# Patient Record
Sex: FEMALE | Race: OTHER | Employment: OTHER | URBAN - METROPOLITAN AREA
[De-identification: names, ages, dates, MRNs, and addresses within clinical notes are randomized per-mention and may not be internally consistent; named-entity substitution may affect disease eponyms.]

---

## 2024-03-11 ENCOUNTER — NURSE TRIAGE (OUTPATIENT)
Age: 69
End: 2024-03-11

## 2024-03-11 NOTE — TELEPHONE ENCOUNTER
----- Message from Shabnam Sanford MA sent at 3/11/2024 10:25 AM EDT -----  Pt calling asking for a call back due to a lot of rectal bleeding since this morning and abd pain.  Citizen of [place] speaking

## 2024-03-11 NOTE — TELEPHONE ENCOUNTER
Spoke to pt using ([id number])  Started last night after eating a meal that didn't agree with her. Blood clots in bowl and on toilet paper, stops when she is done going. No history of hemorrhoids. Lower middle abdominal pain.

## 2024-03-13 ENCOUNTER — OFFICE VISIT (OUTPATIENT)
Dept: GASTROENTEROLOGY | Facility: CLINIC | Age: 69
End: 2024-03-13
Payer: MEDICARE

## 2024-03-13 VITALS
OXYGEN SATURATION: 95 % | HEIGHT: 63 IN | WEIGHT: 209.4 LBS | BODY MASS INDEX: 37.1 KG/M2 | TEMPERATURE: 97.8 F | SYSTOLIC BLOOD PRESSURE: 118 MMHG | DIASTOLIC BLOOD PRESSURE: 80 MMHG | HEART RATE: 78 BPM

## 2024-03-13 DIAGNOSIS — K21.9 GASTROESOPHAGEAL REFLUX DISEASE, UNSPECIFIED WHETHER ESOPHAGITIS PRESENT: ICD-10-CM

## 2024-03-13 DIAGNOSIS — K62.5 RECTAL BLEEDING: Primary | ICD-10-CM

## 2024-03-13 PROCEDURE — 99203 OFFICE O/P NEW LOW 30 MIN: CPT | Performed by: PHYSICIAN ASSISTANT

## 2024-03-13 RX ORDER — DICLOFENAC POTASSIUM 50 MG/1
50 TABLET, FILM COATED ORAL 3 TIMES DAILY
COMMUNITY

## 2024-03-13 RX ORDER — ASPIRIN/SOD BICARB/CITRIC ACID 324 MG
325 TABLET, EFFERVESCENT ORAL EVERY 6 HOURS PRN
COMMUNITY
End: 2024-03-13

## 2024-03-13 RX ORDER — PANTOPRAZOLE SODIUM 40 MG/1
40 TABLET, DELAYED RELEASE ORAL DAILY
COMMUNITY

## 2024-03-13 NOTE — PROGRESS NOTES
[facility] - Outpatient Consultation  La Hebert Flex 69 y.o. female MRN: [id number]  Encounter: [id number]          ASSESSMENT AND PLAN:      1. Rectal bleeding  Episode of what sounds like infectious gastroenteritis Sunday evening - Monday  Initially with N/V/D but eventually with BRBPR   She notes a normal BM today with small amounts of blood  Will check CBC  Will plan colonoscopy  Her last colonoscopy was 3 years ago in [place] with polyps removed    2. Gastroesophageal reflux disease, unspecified whether esophagitis present  Chronic  Takes Pantoprazole PRN  She had an EGD last year in [place] but does not have access to the records and is not sure if there were any serious concerns  Will plan EGD  ______________________________________________________________________    HPI: 59-year-old female with a history of GERD who presents for evaluation of rectal bleeding.  The patient reports that she was in her usual state of good health on Sunday.  She went out to eat with her family.  After returning home she began to feel ill.  She reports that she had nausea, vomiting and diarrhea.  She reports that after her initial bowel movement which was nonbloody her stools became excessively bloody.  She reports that eventually she was only passing blood and blood clots.  This persisted Sunday evening into Monday.  She reports that by yesterday which was Tuesday she was feeling slightly better.  She reports that she had a normal bowel movement today still with a small amount of red blood.  She reports that her daughter who was with her got sick as well but mainly with nausea and vomiting which was quick lived.  Patient reports that her last colonoscopy was about 3 years ago in [place].  She reports having had polyps removed at that time.  She was told that she was due in September of this year but was living in the [place] by that time and so did not have it done.  She denies any  recent unexpected weight loss.  She denies any family history of colorectal cancer.  She takes pantoprazole 40 mg as needed for her acid reflux.  This does work for the most part.  She has no dysphagia or dyne aphasia.  She has had no hematemesis or melena.  She reports having had an upper endoscopy last year in [place].  She was told that there were some abnormalities but cannot remember what this was.  She does remember being told that her lower esophageal sphincter was weak.      REVIEW OF SYSTEMS:    CONSTITUTIONAL: Denies any fever, chills, rigors, and weight loss.  HEENT: No earache or tinnitus. Denies hearing loss or visual disturbances.  CARDIOVASCULAR: No chest pain or palpitations.   RESPIRATORY: Denies any cough, hemoptysis, shortness of breath or dyspnea on exertion.  GASTROINTESTINAL: As noted in the History of Present Illness.   GENITOURINARY: No problems with urination. Denies any hematuria or dysuria.  NEUROLOGIC: No dizziness or vertigo, denies headaches.   MUSCULOSKELETAL: Denies any muscle or joint pain.   SKIN: Denies skin rashes or itching.   ENDOCRINE: Denies excessive thirst. Denies intolerance to heat or cold.  PSYCHOSOCIAL: Denies depression or anxiety. Denies any recent memory loss.       Historical Information   Past Medical History:   Diagnosis Date    Acid reflux     Diabetes mellitus (HCC)     Migraine      Past Surgical History:   Procedure Laterality Date     SECTION      FOOT SURGERY      LUMBAR SPINE SURGERY  2007     Social History   Social History     Substance and Sexual Activity   Alcohol Use Never     Social History     Substance and Sexual Activity   Drug Use Never     Social History     Tobacco Use   Smoking Status Never   Smokeless Tobacco Never     History reviewed. No pertinent family history.    Meds/Allergies       Current Outpatient Medications:     aspirin-sod bicarb-citric acid (Meri-Gilboa) 325 MG TBEF    diclofenac potassium (CATAFLAM) 50 mg tablet    " pantoprazole (PROTONIX) 40 mg tablet    No Known Allergies        Objective     Blood pressure 118/80, pulse 78, temperature 97.8 °F (36.6 °C), height 5' 3\" (1.6 m), weight 95 kg (209 lb 6.4 oz), SpO2 95%. Body mass index is 37.09 kg/m².        PHYSICAL EXAM:      General Appearance:   Alert, cooperative, no distress   HEENT:   Normocephalic, atraumatic, anicteric.     Neck:  Supple, symmetrical, trachea midline   Lungs:   Clear to auscultation bilaterally; no rales, rhonchi or wheezing; respirations unlabored    Heart::   Regular rate and rhythm; no murmur, rub, or gallop.   Abdomen:   Soft, non-tender, non-distended; normal bowel sounds; no masses, no organomegaly    Genitalia:   Deferred    Rectal:   Deferred    Extremities:  No cyanosis, clubbing or edema    Pulses:  2+ and symmetric    Skin:  No jaundice, rashes, or lesions    Lymph nodes:  No palpable cervical lymphadenopathy        Lab Results:   No results found for any previous visit.         Radiology Results:   No results found.  "

## 2024-03-13 NOTE — H&P (VIEW-ONLY)
[facility] - Outpatient Consultation  La Hebert Flex 69 y.o. female MRN: [id number]  Encounter: [id number]          ASSESSMENT AND PLAN:      1. Rectal bleeding  Episode of what sounds like infectious gastroenteritis Sunday evening - Monday  Initially with N/V/D but eventually with BRBPR   She notes a normal BM today with small amounts of blood  Will check CBC  Will plan colonoscopy  Her last colonoscopy was 3 years ago in [place] with polyps removed    2. Gastroesophageal reflux disease, unspecified whether esophagitis present  Chronic  Takes Pantoprazole PRN  She had an EGD last year in [place] but does not have access to the records and is not sure if there were any serious concerns  Will plan EGD  ______________________________________________________________________    HPI: 59-year-old female with a history of GERD who presents for evaluation of rectal bleeding.  The patient reports that she was in her usual state of good health on Sunday.  She went out to eat with her family.  After returning home she began to feel ill.  She reports that she had nausea, vomiting and diarrhea.  She reports that after her initial bowel movement which was nonbloody her stools became excessively bloody.  She reports that eventually she was only passing blood and blood clots.  This persisted Sunday evening into Monday.  She reports that by yesterday which was Tuesday she was feeling slightly better.  She reports that she had a normal bowel movement today still with a small amount of red blood.  She reports that her daughter who was with her got sick as well but mainly with nausea and vomiting which was quick lived.  Patient reports that her last colonoscopy was about 3 years ago in [place].  She reports having had polyps removed at that time.  She was told that she was due in September of this year but was [place] by that time and so did not have it done.  She denies any  recent unexpected weight loss.  She denies any family history of colorectal cancer.  She takes pantoprazole 40 mg as needed for her acid reflux.  This does work for the most part.  She has no dysphagia or dyne aphasia.  She has had no hematemesis or melena.  She reports having had an upper endoscopy last year in [place].  She was told that there were some abnormalities but cannot remember what this was.  She does remember being told that her lower esophageal sphincter was weak.      REVIEW OF SYSTEMS:    CONSTITUTIONAL: Denies any fever, chills, rigors, and weight loss.  HEENT: No earache or tinnitus. Denies hearing loss or visual disturbances.  CARDIOVASCULAR: No chest pain or palpitations.   RESPIRATORY: Denies any cough, hemoptysis, shortness of breath or dyspnea on exertion.  GASTROINTESTINAL: As noted in the History of Present Illness.   GENITOURINARY: No problems with urination. Denies any hematuria or dysuria.  NEUROLOGIC: No dizziness or vertigo, denies headaches.   MUSCULOSKELETAL: Denies any muscle or joint pain.   SKIN: Denies skin rashes or itching.   ENDOCRINE: Denies excessive thirst. Denies intolerance to heat or cold.  PSYCHOSOCIAL: Denies depression or anxiety. Denies any recent memory loss.       Historical Information   Past Medical History:   Diagnosis Date    Acid reflux     Diabetes mellitus (HCC)     Migraine      Past Surgical History:   Procedure Laterality Date     SECTION      FOOT SURGERY      LUMBAR SPINE SURGERY  2007     Social History   Social History     Substance and Sexual Activity   Alcohol Use Never     Social History     Substance and Sexual Activity   Drug Use Never     Social History     Tobacco Use   Smoking Status Never   Smokeless Tobacco Never     History reviewed. No pertinent family history.    Meds/Allergies       Current Outpatient Medications:     aspirin-sod bicarb-citric acid (Meri-[place]) 325 MG TBEF    diclofenac potassium (CATAFLAM) 50 mg tablet    " pantoprazole (PROTONIX) 40 mg tablet    No Known Allergies        Objective     Blood pressure 118/80, pulse 78, temperature 97.8 °F (36.6 °C), height 5' 3\" (1.6 m), weight 95 kg (209 lb 6.4 oz), SpO2 95%. Body mass index is 37.09 kg/m².        PHYSICAL EXAM:      General Appearance:   Alert, cooperative, no distress   HEENT:   Normocephalic, atraumatic, anicteric.     Neck:  Supple, symmetrical, trachea midline   Lungs:   Clear to auscultation bilaterally; no rales, rhonchi or wheezing; respirations unlabored    Heart::   Regular rate and rhythm; no murmur, rub, or gallop.   Abdomen:   Soft, non-tender, non-distended; normal bowel sounds; no masses, no organomegaly    Genitalia:   Deferred    Rectal:   Deferred    Extremities:  No cyanosis, clubbing or edema    Pulses:  2+ and symmetric    Skin:  No jaundice, rashes, or lesions    Lymph nodes:  No palpable cervical lymphadenopathy        Lab Results:   No results found for any previous visit.         Radiology Results:   No results found.  "

## 2024-03-13 NOTE — PATIENT INSTRUCTIONS
Scheduled date of EGD/colonoscopy (as of today):3/20/24  Physician performing EGD/colonoscopy:Jim  Location of EGD/colonoscopy:[facility]  Desired bowel prep reviewed with patient:Thomas/Miralax  Instructions reviewed with patient by:Meek soto  Clearances:   none

## 2024-03-14 ENCOUNTER — APPOINTMENT (OUTPATIENT)
Dept: LAB | Facility: HOSPITAL | Age: 69
End: 2024-03-14
Payer: MEDICARE

## 2024-03-14 DIAGNOSIS — K62.5 RECTAL BLEEDING: ICD-10-CM

## 2024-03-14 LAB
ERYTHROCYTE [DISTWIDTH] IN BLOOD BY AUTOMATED COUNT: 14.4 % (ref 11.6–15.1)
HCT VFR BLD AUTO: 43.9 % (ref 34.8–46.1)
HGB BLD-MCNC: 13.8 G/DL (ref 11.5–15.4)
MCH RBC QN AUTO: 27.6 PG (ref 26.8–34.3)
MCHC RBC AUTO-ENTMCNC: 31.4 G/DL (ref 31.4–37.4)
MCV RBC AUTO: 88 FL (ref 82–98)
PLATELET # BLD AUTO: 158 THOUSANDS/UL (ref 149–390)
PMV BLD AUTO: 9.4 FL (ref 8.9–12.7)
RBC # BLD AUTO: 5 MILLION/UL (ref 3.81–5.12)
WBC # BLD AUTO: 5.82 THOUSAND/UL (ref 4.31–10.16)

## 2024-03-14 PROCEDURE — 36415 COLL VENOUS BLD VENIPUNCTURE: CPT

## 2024-03-14 PROCEDURE — 85027 COMPLETE CBC AUTOMATED: CPT

## 2024-03-15 ENCOUNTER — TELEPHONE (OUTPATIENT)
Dept: GASTROENTEROLOGY | Facility: CLINIC | Age: 69
End: 2024-03-15

## 2024-03-15 NOTE — TELEPHONE ENCOUNTER
Called and spoke with pt's daughter ANEESH and relayed the pt's lab result as per Charlene.    Aneesh voiced understanding.        Please advise patient([place]speaking) or her granddaughter that her labs are normal

## 2024-03-20 ENCOUNTER — HOSPITAL ENCOUNTER (OUTPATIENT)
Dept: GASTROENTEROLOGY | Facility: HOSPITAL | Age: 69
Setting detail: OUTPATIENT SURGERY
Discharge: HOME | End: 2024-03-20
Payer: MEDICARE

## 2024-03-20 ENCOUNTER — ANESTHESIA (OUTPATIENT)
Dept: GASTROENTEROLOGY | Facility: HOSPITAL | Age: 69
End: 2024-03-20

## 2024-03-20 ENCOUNTER — ANESTHESIA EVENT (OUTPATIENT)
Dept: GASTROENTEROLOGY | Facility: HOSPITAL | Age: 69
End: 2024-03-20

## 2024-03-20 VITALS
SYSTOLIC BLOOD PRESSURE: 120 MMHG | HEIGHT: 63 IN | WEIGHT: 209.22 LBS | OXYGEN SATURATION: 99 % | RESPIRATION RATE: 16 BRPM | BODY MASS INDEX: 37.07 KG/M2 | HEART RATE: 66 BPM | DIASTOLIC BLOOD PRESSURE: 61 MMHG | TEMPERATURE: 97.4 F

## 2024-03-20 DIAGNOSIS — K62.5 RECTAL BLEEDING: ICD-10-CM

## 2024-03-20 DIAGNOSIS — K21.9 GASTROESOPHAGEAL REFLUX DISEASE, UNSPECIFIED WHETHER ESOPHAGITIS PRESENT: ICD-10-CM

## 2024-03-20 PROCEDURE — 88305 TISSUE EXAM BY PATHOLOGIST: CPT | Performed by: STUDENT IN AN ORGANIZED HEALTH CARE EDUCATION/TRAINING PROGRAM

## 2024-03-20 PROCEDURE — 88342 IMHCHEM/IMCYTCHM 1ST ANTB: CPT | Performed by: STUDENT IN AN ORGANIZED HEALTH CARE EDUCATION/TRAINING PROGRAM

## 2024-03-20 PROCEDURE — 43239 EGD BIOPSY SINGLE/MULTIPLE: CPT | Performed by: INTERNAL MEDICINE

## 2024-03-20 PROCEDURE — 45380 COLONOSCOPY AND BIOPSY: CPT | Performed by: INTERNAL MEDICINE

## 2024-03-20 RX ORDER — PROPOFOL 10 MG/ML
INJECTION, EMULSION INTRAVENOUS AS NEEDED
Status: DISCONTINUED | OUTPATIENT
Start: 2024-03-20 | End: 2024-03-20

## 2024-03-20 RX ORDER — LIDOCAINE HYDROCHLORIDE 20 MG/ML
INJECTION, SOLUTION EPIDURAL; INFILTRATION; INTRACAUDAL; PERINEURAL AS NEEDED
Status: DISCONTINUED | OUTPATIENT
Start: 2024-03-20 | End: 2024-03-20

## 2024-03-20 RX ORDER — SODIUM CHLORIDE, SODIUM LACTATE, POTASSIUM CHLORIDE, CALCIUM CHLORIDE 600; 310; 30; 20 MG/100ML; MG/100ML; MG/100ML; MG/100ML
INJECTION, SOLUTION INTRAVENOUS CONTINUOUS PRN
Status: DISCONTINUED | OUTPATIENT
Start: 2024-03-20 | End: 2024-03-20

## 2024-03-20 RX ADMIN — LIDOCAINE HYDROCHLORIDE 100 MG: 20 INJECTION, SOLUTION EPIDURAL; INFILTRATION; INTRACAUDAL; PERINEURAL at 12:20

## 2024-03-20 RX ADMIN — PROPOFOL 50 MG: 10 INJECTION, EMULSION INTRAVENOUS at 12:22

## 2024-03-20 RX ADMIN — PROPOFOL 100 MG: 10 INJECTION, EMULSION INTRAVENOUS at 12:20

## 2024-03-20 RX ADMIN — SODIUM CHLORIDE, SODIUM LACTATE, POTASSIUM CHLORIDE, AND CALCIUM CHLORIDE: .6; .31; .03; .02 INJECTION, SOLUTION INTRAVENOUS at 12:14

## 2024-03-20 RX ADMIN — PROPOFOL 30 MG: 10 INJECTION, EMULSION INTRAVENOUS at 12:33

## 2024-03-20 RX ADMIN — PROPOFOL 50 MG: 10 INJECTION, EMULSION INTRAVENOUS at 12:26

## 2024-03-20 RX ADMIN — PROPOFOL 30 MG: 10 INJECTION, EMULSION INTRAVENOUS at 12:29

## 2024-03-20 NOTE — ANESTHESIA PREPROCEDURE EVALUATION
Procedure:  COLONOSCOPY  EGD    Relevant Problems   No relevant active problems      Diabetes mellitus (HCC)    Migraine    Acid reflux          Physical Exam    Airway    Mallampati score: II  TM Distance: >3 FB  Neck ROM: full     Dental       Cardiovascular  Cardiovascular exam normal    Pulmonary  Pulmonary exam normal     Other Findings  post-pubertal.      Anesthesia Plan  ASA Score- 2     Anesthesia Type- IV sedation with anesthesia with ASA Monitors.         Additional Monitors:     Airway Plan:            Plan Factors-Exercise tolerance (METS): >4 METS.    Chart reviewed. EKG reviewed. Imaging results reviewed. Existing labs reviewed. Patient summary reviewed.                  Induction- intravenous.    Postoperative Plan-     Informed Consent- Anesthetic plan and risks discussed with patient.  I personally reviewed this patient with the CRNA. Discussed and agreed on the Anesthesia Plan with the CRNA..

## 2024-03-20 NOTE — ANESTHESIA POSTPROCEDURE EVALUATION
Post-Op Assessment Note    CV Status:  Stable  Pain Score: 0    Pain management: adequate       Mental Status:  Alert and awake   Hydration Status:  Euvolemic   PONV Controlled:  Controlled   Airway Patency:  Patent  Two or more mitigation strategies used for obstructive sleep apnea   Post Op Vitals Reviewed: Yes    No anethesia notable event occurred.    Staff: Anesthesiologist, CRNA               BP   116/60   Temp 97.4   Pulse  80   Resp 16   SpO2 98

## 2024-03-20 NOTE — INTERVAL H&P NOTE
H&P reviewed. After examining the patient I find no changes in the patients condition since the H&P had been written.    Vitals:    03/20/24 1141   BP: 170/85   Pulse: 78   Resp: 16   Temp: 98 °F (36.7 °C)   SpO2: 96%

## 2024-03-22 PROCEDURE — 88305 TISSUE EXAM BY PATHOLOGIST: CPT | Performed by: STUDENT IN AN ORGANIZED HEALTH CARE EDUCATION/TRAINING PROGRAM

## 2024-03-22 PROCEDURE — 88342 IMHCHEM/IMCYTCHM 1ST ANTB: CPT | Performed by: STUDENT IN AN ORGANIZED HEALTH CARE EDUCATION/TRAINING PROGRAM
